# Patient Record
Sex: FEMALE | Race: WHITE | Employment: STUDENT | URBAN - NONMETROPOLITAN AREA
[De-identification: names, ages, dates, MRNs, and addresses within clinical notes are randomized per-mention and may not be internally consistent; named-entity substitution may affect disease eponyms.]

---

## 2020-02-26 ENCOUNTER — DOCTOR'S OFFICE (OUTPATIENT)
Dept: URBAN - NONMETROPOLITAN AREA CLINIC 1 | Facility: CLINIC | Age: 20
Setting detail: OPHTHALMOLOGY
End: 2020-02-26
Payer: COMMERCIAL

## 2020-02-26 ENCOUNTER — RX ONLY (RX ONLY)
Age: 20
End: 2020-02-26

## 2020-02-26 DIAGNOSIS — H52.223: ICD-10-CM

## 2020-02-26 DIAGNOSIS — H52.13: ICD-10-CM

## 2020-02-26 PROCEDURE — 92004 COMPRE OPH EXAM NEW PT 1/>: CPT | Performed by: OPTOMETRIST

## 2020-02-26 ASSESSMENT — CONFRONTATIONAL VISUAL FIELD TEST (CVF)
OD_FINDINGS: FULL
OS_FINDINGS: FULL

## 2020-02-26 ASSESSMENT — REFRACTION_CURRENTRX
OD_CYLINDER: -2.25
OD_SPHERE: -1.50
OD_VPRISM_DIRECTION: SV
OS_SPHERE: -2.25
OS_AXIS: 86
OS_VPRISM_DIRECTION: SV
OD_OVR_VA: 20/
OS_CYLINDER: -1.25
OD_AXIS: 88
OS_OVR_VA: 20/

## 2020-02-26 ASSESSMENT — REFRACTION_MANIFEST
OS_VA2: 20/20
OS_VA1: 20/20-1
OD_VA2: 20/20
OD_SPHERE: -1.50
OS_SPHERE: -2.50
OS_CYLINDER: -1.00
OD_AXIS: 090
OD_VA1: 20/20
OS_AXIS: 085
OD_CYLINDER: -1.75

## 2020-02-26 ASSESSMENT — REFRACTION_AUTOREFRACTION
OS_SPHERE: -2.50
OS_AXIS: 83
OD_SPHERE: -1.50
OS_CYLINDER: -0.75
OD_AXIS: 89
OD_CYLINDER: -1.75

## 2020-02-26 ASSESSMENT — SPHEQUIV_DERIVED
OD_SPHEQUIV: -2.375
OS_SPHEQUIV: -3
OD_SPHEQUIV: -2.375
OS_SPHEQUIV: -2.875

## 2020-02-26 ASSESSMENT — VISUAL ACUITY
OS_BCVA: 20/20-2
OD_BCVA: 20/25

## 2020-02-27 ENCOUNTER — OPTICAL OFFICE (OUTPATIENT)
Dept: URBAN - NONMETROPOLITAN AREA CLINIC 4 | Facility: CLINIC | Age: 20
Setting detail: OPHTHALMOLOGY
End: 2020-02-27
Payer: COMMERCIAL

## 2020-02-27 DIAGNOSIS — H52.223: ICD-10-CM

## 2020-02-27 PROCEDURE — V2784 LENS POLYCARB OR EQUAL: HCPCS | Performed by: OPTOMETRIST

## 2020-02-27 PROCEDURE — V2020 VISION SVCS FRAMES PURCHASES: HCPCS | Performed by: OPTOMETRIST

## 2020-02-27 PROCEDURE — V2103 SPHEROCYLINDR 4.00D/12-2.00D: HCPCS | Performed by: OPTOMETRIST

## 2021-10-14 ENCOUNTER — DOCTOR'S OFFICE (OUTPATIENT)
Dept: URBAN - NONMETROPOLITAN AREA CLINIC 1 | Facility: CLINIC | Age: 21
Setting detail: OPHTHALMOLOGY
End: 2021-10-14
Payer: COMMERCIAL

## 2021-10-14 DIAGNOSIS — H52.223: ICD-10-CM

## 2021-10-14 DIAGNOSIS — Z01.00: ICD-10-CM

## 2021-10-14 DIAGNOSIS — H52.13: ICD-10-CM

## 2021-10-14 PROCEDURE — 92014 COMPRE OPH EXAM EST PT 1/>: CPT | Performed by: OPTOMETRIST

## 2021-10-14 PROCEDURE — 92015 DETERMINE REFRACTIVE STATE: CPT | Performed by: OPTOMETRIST

## 2021-10-14 ASSESSMENT — REFRACTION_AUTOREFRACTION
OS_CYLINDER: SPH
OD_AXIS: 086
OD_CYLINDER: -1.75
OD_SPHERE: -2.00
OS_SPHERE: -3.50

## 2021-10-14 ASSESSMENT — REFRACTION_MANIFEST
OD_VA1: 20/20
OD_CYLINDER: -1.75
OS_VA1: 20/20-1
OD_SPHERE: -2.00
OS_CYLINDER: -0.75
OD_VA2: 20/20
OS_AXIS: 085
OS_VA2: 20/20
OS_SPHERE: -3.00
OD_AXIS: 090

## 2021-10-14 ASSESSMENT — REFRACTION_CURRENTRX
OS_CYLINDER: -1.25
OD_VPRISM_DIRECTION: SV
OD_SPHERE: -1.50
OS_VPRISM_DIRECTION: SV
OD_OVR_VA: 20/
OD_CYLINDER: -2.25
OD_AXIS: 88
OS_AXIS: 86
OS_SPHERE: -2.25
OS_OVR_VA: 20/

## 2021-10-14 ASSESSMENT — SPHEQUIV_DERIVED
OD_SPHEQUIV: -2.875
OD_SPHEQUIV: -2.875
OS_SPHEQUIV: -3.375

## 2021-10-14 ASSESSMENT — VISUAL ACUITY
OD_BCVA: 20/40
OS_BCVA: 20/40

## 2021-10-14 ASSESSMENT — TONOMETRY
OD_IOP_MMHG: 18
OS_IOP_MMHG: 18

## 2021-10-15 ENCOUNTER — OPTICAL OFFICE (OUTPATIENT)
Dept: URBAN - NONMETROPOLITAN AREA CLINIC 4 | Facility: CLINIC | Age: 21
Setting detail: OPHTHALMOLOGY
End: 2021-10-15
Payer: COMMERCIAL

## 2021-10-15 DIAGNOSIS — H52.223: ICD-10-CM

## 2021-10-15 PROCEDURE — V2020 VISION SVCS FRAMES PURCHASES: HCPCS | Performed by: OPTOMETRIST

## 2021-10-15 PROCEDURE — V2103 SPHEROCYLINDR 4.00D/12-2.00D: HCPCS | Performed by: OPTOMETRIST

## 2021-12-26 ENCOUNTER — HOSPITAL ENCOUNTER (EMERGENCY)
Facility: HOSPITAL | Age: 21
End: 2021-12-27
Attending: EMERGENCY MEDICINE | Admitting: EMERGENCY MEDICINE
Payer: COMMERCIAL

## 2021-12-26 DIAGNOSIS — F32.A DEPRESSION: Primary | ICD-10-CM

## 2021-12-26 DIAGNOSIS — F41.9 ANXIETY: ICD-10-CM

## 2021-12-26 DIAGNOSIS — R45.851 SUICIDAL IDEATIONS: ICD-10-CM

## 2021-12-26 LAB
AMPHETAMINES SERPL QL SCN: NEGATIVE
ANION GAP SERPL CALCULATED.3IONS-SCNC: 8 MMOL/L (ref 4–13)
BARBITURATES UR QL: NEGATIVE
BASOPHILS # BLD AUTO: 0.04 THOUSANDS/ΜL (ref 0–0.1)
BASOPHILS NFR BLD AUTO: 1 % (ref 0–1)
BENZODIAZ UR QL: NEGATIVE
BILIRUB UR QL STRIP: NEGATIVE
BUN SERPL-MCNC: 10 MG/DL (ref 5–25)
CALCIUM SERPL-MCNC: 9.2 MG/DL (ref 8.3–10.1)
CHLORIDE SERPL-SCNC: 101 MMOL/L (ref 100–108)
CLARITY UR: CLEAR
CO2 SERPL-SCNC: 30 MMOL/L (ref 21–32)
COCAINE UR QL: NEGATIVE
COLOR UR: YELLOW
CREAT SERPL-MCNC: 0.89 MG/DL (ref 0.6–1.3)
EOSINOPHIL # BLD AUTO: 0.03 THOUSAND/ΜL (ref 0–0.61)
EOSINOPHIL NFR BLD AUTO: 0 % (ref 0–6)
ERYTHROCYTE [DISTWIDTH] IN BLOOD BY AUTOMATED COUNT: 11.9 % (ref 11.6–15.1)
ETHANOL SERPL-MCNC: <3 MG/DL (ref 0–3)
EXT PREG TEST URINE: NEGATIVE
EXT. CONTROL ED NAV: NORMAL
FLUAV RNA RESP QL NAA+PROBE: NEGATIVE
FLUBV RNA RESP QL NAA+PROBE: NEGATIVE
GLUCOSE SERPL-MCNC: 152 MG/DL (ref 65–140)
GLUCOSE UR STRIP-MCNC: NEGATIVE MG/DL
HCT VFR BLD AUTO: 42.4 % (ref 36.5–46.1)
HGB BLD-MCNC: 15.2 G/DL (ref 12–15.4)
HGB UR QL STRIP.AUTO: NEGATIVE
IMM GRANULOCYTES # BLD AUTO: 0.01 THOUSAND/UL (ref 0–0.2)
IMM GRANULOCYTES NFR BLD AUTO: 0 % (ref 0–2)
KETONES UR STRIP-MCNC: ABNORMAL MG/DL
LEUKOCYTE ESTERASE UR QL STRIP: NEGATIVE
LYMPHOCYTES # BLD AUTO: 1.69 THOUSANDS/ΜL (ref 0.6–4.47)
LYMPHOCYTES NFR BLD AUTO: 25 % (ref 14–44)
MCH RBC QN AUTO: 31.1 PG (ref 26.8–34.3)
MCHC RBC AUTO-ENTMCNC: 35.8 G/DL (ref 31.4–37.4)
MCV RBC AUTO: 87 FL (ref 82–98)
METHADONE UR QL: NEGATIVE
MONOCYTES # BLD AUTO: 0.4 THOUSAND/ΜL (ref 0.17–1.22)
MONOCYTES NFR BLD AUTO: 6 % (ref 4–12)
NEUTROPHILS # BLD AUTO: 4.55 THOUSANDS/ΜL (ref 1.85–7.62)
NEUTS SEG NFR BLD AUTO: 68 % (ref 43–75)
NITRITE UR QL STRIP: NEGATIVE
NRBC BLD AUTO-RTO: 0 /100 WBCS
OPIATES UR QL SCN: NEGATIVE
OXYCODONE+OXYMORPHONE UR QL SCN: NEGATIVE
PCP UR QL: NEGATIVE
PH UR STRIP.AUTO: 6 [PH]
PLATELET # BLD AUTO: 263 THOUSANDS/UL (ref 149–390)
PMV BLD AUTO: 9.2 FL (ref 8.9–12.7)
POTASSIUM SERPL-SCNC: 4 MMOL/L (ref 3.5–5.3)
PROT UR STRIP-MCNC: NEGATIVE MG/DL
RBC # BLD AUTO: 4.88 MILLION/UL (ref 3.88–5.12)
RSV RNA RESP QL NAA+PROBE: NEGATIVE
SARS-COV-2 RNA RESP QL NAA+PROBE: NEGATIVE
SODIUM SERPL-SCNC: 139 MMOL/L (ref 136–145)
SP GR UR STRIP.AUTO: 1.02 (ref 1–1.03)
THC UR QL: NEGATIVE
UROBILINOGEN UR QL STRIP.AUTO: 2 E.U./DL
WBC # BLD AUTO: 6.72 THOUSAND/UL (ref 4.31–10.16)

## 2021-12-26 PROCEDURE — 99285 EMERGENCY DEPT VISIT HI MDM: CPT

## 2021-12-26 PROCEDURE — 80307 DRUG TEST PRSMV CHEM ANLYZR: CPT | Performed by: PHYSICIAN ASSISTANT

## 2021-12-26 PROCEDURE — 81025 URINE PREGNANCY TEST: CPT | Performed by: PHYSICIAN ASSISTANT

## 2021-12-26 PROCEDURE — 99285 EMERGENCY DEPT VISIT HI MDM: CPT | Performed by: PHYSICIAN ASSISTANT

## 2021-12-26 PROCEDURE — 80048 BASIC METABOLIC PNL TOTAL CA: CPT | Performed by: PHYSICIAN ASSISTANT

## 2021-12-26 PROCEDURE — 82077 ASSAY SPEC XCP UR&BREATH IA: CPT | Performed by: PHYSICIAN ASSISTANT

## 2021-12-26 PROCEDURE — 36415 COLL VENOUS BLD VENIPUNCTURE: CPT | Performed by: PHYSICIAN ASSISTANT

## 2021-12-26 PROCEDURE — 85025 COMPLETE CBC W/AUTO DIFF WBC: CPT | Performed by: PHYSICIAN ASSISTANT

## 2021-12-26 PROCEDURE — 81003 URINALYSIS AUTO W/O SCOPE: CPT | Performed by: PHYSICIAN ASSISTANT

## 2021-12-26 PROCEDURE — 0241U HB NFCT DS VIR RESP RNA 4 TRGT: CPT | Performed by: PHYSICIAN ASSISTANT

## 2021-12-26 RX ORDER — LAMOTRIGINE 150 MG/1
150 TABLET ORAL 2 TIMES DAILY
COMMUNITY

## 2021-12-26 RX ORDER — QUETIAPINE FUMARATE 100 MG/1
100 TABLET, FILM COATED ORAL
COMMUNITY

## 2021-12-26 RX ORDER — CLONAZEPAM 0.5 MG/1
0.5 TABLET ORAL 3 TIMES DAILY PRN
Status: DISCONTINUED | OUTPATIENT
Start: 2021-12-26 | End: 2021-12-27 | Stop reason: HOSPADM

## 2021-12-26 RX ORDER — NICOTINE 21 MG/24HR
21 PATCH, TRANSDERMAL 24 HOURS TRANSDERMAL DAILY
Status: DISCONTINUED | OUTPATIENT
Start: 2021-12-26 | End: 2021-12-27 | Stop reason: HOSPADM

## 2021-12-26 RX ORDER — CLONAZEPAM 0.5 MG/1
TABLET ORAL AS NEEDED
COMMUNITY

## 2021-12-26 RX ORDER — QUETIAPINE FUMARATE 50 MG/1
50 TABLET, FILM COATED ORAL
COMMUNITY

## 2021-12-26 RX ORDER — PRAZOSIN HYDROCHLORIDE 1 MG/1
2 CAPSULE ORAL
COMMUNITY

## 2021-12-26 RX ORDER — PRAZOSIN HYDROCHLORIDE 2 MG/1
2 CAPSULE ORAL
Status: DISCONTINUED | OUTPATIENT
Start: 2021-12-26 | End: 2021-12-27 | Stop reason: HOSPADM

## 2021-12-26 RX ORDER — VENLAFAXINE HYDROCHLORIDE 37.5 MG/1
37.5 CAPSULE, EXTENDED RELEASE ORAL
Status: DISCONTINUED | OUTPATIENT
Start: 2021-12-27 | End: 2021-12-27 | Stop reason: HOSPADM

## 2021-12-26 RX ORDER — VENLAFAXINE HYDROCHLORIDE 75 MG/1
75 CAPSULE, EXTENDED RELEASE ORAL
Status: DISCONTINUED | OUTPATIENT
Start: 2021-12-27 | End: 2021-12-27 | Stop reason: HOSPADM

## 2021-12-26 RX ORDER — NORETHINDRONE ACETATE AND ETHINYL ESTRADIOL 1MG-20(21)
KIT ORAL DAILY
COMMUNITY
Start: 2021-12-13

## 2021-12-26 RX ORDER — VENLAFAXINE HYDROCHLORIDE 37.5 MG/1
37.5 CAPSULE, EXTENDED RELEASE ORAL DAILY
COMMUNITY

## 2021-12-26 RX ORDER — VENLAFAXINE HYDROCHLORIDE 75 MG/1
CAPSULE, EXTENDED RELEASE ORAL DAILY
COMMUNITY

## 2021-12-26 RX ORDER — LANSOPRAZOLE 30 MG/1
30 CAPSULE, DELAYED RELEASE ORAL DAILY
COMMUNITY

## 2021-12-26 RX ORDER — PANTOPRAZOLE SODIUM 40 MG/1
40 TABLET, DELAYED RELEASE ORAL
Status: DISCONTINUED | OUTPATIENT
Start: 2021-12-27 | End: 2021-12-27 | Stop reason: HOSPADM

## 2021-12-26 RX ORDER — NORETHINDRONE ACETATE AND ETHINYL ESTRADIOL 1MG-20(21)
1 KIT ORAL DAILY
Status: DISCONTINUED | OUTPATIENT
Start: 2021-12-26 | End: 2021-12-27 | Stop reason: HOSPADM

## 2021-12-26 RX ORDER — HYDROXYZINE HYDROCHLORIDE 25 MG/1
25 TABLET, FILM COATED ORAL
Status: DISCONTINUED | OUTPATIENT
Start: 2021-12-26 | End: 2021-12-27 | Stop reason: HOSPADM

## 2021-12-26 RX ORDER — HYDROXYZINE HYDROCHLORIDE 25 MG/1
TABLET, FILM COATED ORAL AS NEEDED
COMMUNITY

## 2021-12-26 RX ADMIN — QUETIAPINE FUMARATE 150 MG: 100 TABLET ORAL at 20:47

## 2021-12-26 RX ADMIN — PRAZOSIN HYDROCHLORIDE 2 MG: 2 CAPSULE ORAL at 20:47

## 2021-12-26 RX ADMIN — LAMOTRIGINE 150 MG: 100 TABLET ORAL at 18:57

## 2021-12-26 RX ADMIN — NICOTINE 21 MG: 21 PATCH, EXTENDED RELEASE TRANSDERMAL at 14:21

## 2021-12-26 RX ADMIN — NORETHINDRONE ACETATE AND ETHINYL ESTRADIOL 1 TABLET: KIT at 18:58

## 2021-12-26 RX ADMIN — HYDROXYZINE HYDROCHLORIDE 25 MG: 25 TABLET, FILM COATED ORAL at 20:47

## 2021-12-26 NOTE — ED NOTES
Clinical and 201 commitment at intake  There are currently no beds within the network  Bed search for single room will be needed

## 2021-12-26 NOTE — ED NOTES
Clinical faxed to Alexandrea [facility] for review  [facility] admissions stated they would not get to review until 3rd shift  [facility] Crisis number provided for follow up

## 2021-12-26 NOTE — ED NOTES
EVS (Eligibility Verification System)    PROMISe:  [facility] - Ren[facility]    [id number]

## 2021-12-26 NOTE — ED NOTES
Copy of 201 and crisis assessment sent into [facility] behavioral intake line to begin bed search  They report there are no beds within network at this time but will continue to re-evaluate        Saad Peng RN  12/26/21 7993

## 2021-12-26 NOTE — ED PROVIDER NOTES
History  Chief Complaint   Patient presents with    Psychiatric Evaluation     pt states she called crisis stating experiencing several panick attacks within past couple days and SI thoughts of going to sleep and not waking up  denies current plan  hx anxiety, depression  pt mentioned past 2 months have been hard due to death of relative and other family issues  denies HI  pt instructed by [facility] to come to ED per further inpt psych evaluation and tx  Patient is a 79-year-old female with past medical history PTSD, depression anxiety disorder who presents emergency for SI and severe anxiety  Patient states that his worsening over last 2 months ever since her grandmother   She states that she has been having increased panic attacks and increased thoughts of harming herself  She has no distinct plan  Psychiatric Evaluation  Presenting symptoms: depression and suicidal thoughts    Degree of incapacity (severity): Moderate  Timing:  Constant  Progression:  Worsening  Chronicity:  New  Context: medication and stressful life event    Treatment compliance: All of the time  Relieved by:  Nothing  Worsened by:  Nothing  Ineffective treatments:  Anti-anxiety medications and antidepressants  Associated symptoms: anxiety and appetite change    Associated symptoms: no abdominal pain, no anhedonia, no fatigue, no feelings of worthlessness, no headaches, no hypersomnia, no hyperventilation, no insomnia, no irritability and no weight change    Risk factors: hx of mental illness and hx of suicide attempts        Prior to Admission Medications   Prescriptions Last Dose Informant Patient Reported? Taking?    QUEtiapine (SEROquel) 100 mg tablet  Self Yes Yes   Sig: Take 100 mg by mouth daily at bedtime   QUEtiapine (SEROquel) 50 mg tablet  Self Yes Yes   Sig: Take 50 mg by mouth daily at bedtime   clonazePAM (KlonoPIN) 0 5 mg tablet   Yes Yes   Sig: as needed   hydrOXYzine HCL (ATARAX) 25 mg tablet   Yes Yes Sig: Take by mouth as needed   lamoTRIgine (LaMICtal) 150 MG tablet   Yes Yes   Sig: Take 150 mg by mouth 2 (two) times a day   lansoprazole (PREVACID) 30 mg capsule   Yes Yes   Sig: Take 30 mg by mouth daily   norethindrone-ethinyl estradiol (JUNEL FE 1/20) 1-20 MG-MCG per tablet   Yes Yes   Sig: Take by mouth daily   prazosin (MINIPRESS) 1 mg capsule   Yes Yes   Sig: Take 2 mg by mouth daily at bedtime   venlafaxine (EFFEXOR-XR) 37 5 mg 24 hr capsule   Yes Yes   Sig: Take 37 5 mg by mouth daily   venlafaxine (EFFEXOR-XR) 75 mg 24 hr capsule   Yes No   Sig: Take by mouth daily      Facility-Administered Medications: None       Past Medical History:   Diagnosis Date    Anxiety     Borderline personality disorder ([facility] [address] )     Depression     IBS (irritable bowel syndrome)     Panic attacks        Past Surgical History:   Procedure Laterality Date    WISDOM TOOTH EXTRACTION         History reviewed  No pertinent family history  I have reviewed and agree with the history as documented  E-Cigarette/Vaping    E-Cigarette Use Current Every Day User      E-Cigarette/Vaping Substances    Nicotine Yes     THC No     CBD No     Flavoring Yes      Social History     Tobacco Use    Smoking status: Former Smoker     Types: Cigarettes    Smokeless tobacco: Never Used   Vaping Use    Vaping Use: Every day    Substances: Nicotine, Flavoring   Substance Use Topics    Alcohol use: Not Currently    Drug use: Not Currently       Review of Systems   Constitutional: Positive for appetite change  Negative for fatigue and irritability  Gastrointestinal: Negative for abdominal pain  Neurological: Negative for headaches  Psychiatric/Behavioral: Positive for suicidal ideas  The patient is nervous/anxious  The patient does not have insomnia  All other systems reviewed and are negative  Physical Exam  Physical Exam  Vitals and nursing note reviewed  Constitutional:       General: He is not in acute distress  Appearance: He is well-developed  HENT:      Head: Normocephalic and atraumatic  Mouth/Throat:      Mouth: Mucous membranes are moist    Eyes:      Extraocular Movements: Extraocular movements intact  Pupils: Pupils are equal, round, and reactive to light  Cardiovascular:      Rate and Rhythm: Normal rate and regular rhythm  Pulses: Normal pulses  Heart sounds: No murmur heard  Pulmonary:      Effort: Pulmonary effort is normal  No respiratory distress  Breath sounds: Normal breath sounds  Abdominal:      General: Bowel sounds are normal       Palpations: Abdomen is soft  Tenderness: There is no abdominal tenderness  Musculoskeletal:      Cervical back: Normal range of motion  Right lower leg: No edema  Left lower leg: No edema  Skin:     General: Skin is warm and dry  Capillary Refill: Capillary refill takes less than 2 seconds  Neurological:      General: No focal deficit present  Mental Status: He is alert and oriented to person, place, and time  Psychiatric:         Mood and Affect: Mood is anxious  Behavior: Behavior normal          Thought Content: Thought content includes suicidal ideation  Thought content does not include homicidal or suicidal plan           Vital Signs  ED Triage Vitals [12/26/21 1310]   Temperature Pulse Respirations Blood Pressure SpO2   98 5 °F (36 9 °C) 100 17 140/92 97 %      Temp Source Heart Rate Source Patient Position - Orthostatic VS BP Location FiO2 (%)   Oral Monitor Sitting Right arm --      Pain Score       No Pain           Vitals:    12/26/21 1310   BP: 140/92   Pulse: 100   Patient Position - Orthostatic VS: Sitting         Visual Acuity      ED Medications  Medications   nicotine (NICODERM CQ) 21 mg/24 hr TD 24 hr patch 21 mg (21 mg Transdermal Medication Applied 12/26/21 1421)   clonazePAM (KlonoPIN) tablet 0 5 mg (has no administration in time range)   hydrOXYzine HCL (ATARAX) tablet 25 mg (25 mg Oral Given 12/26/21 2047)   lamoTRIgine (LaMICtal) tablet 150 mg (150 mg Oral Given 12/26/21 1857)   pantoprazole (PROTONIX) EC tablet 40 mg (has no administration in time range)   prazosin (MINIPRESS) capsule 2 mg (2 mg Oral Given 12/26/21 2047)   QUEtiapine (SEROquel) tablet 150 mg (150 mg Oral Given 12/26/21 2047)   venlafaxine (EFFEXOR-XR) 24 hr capsule 75 mg (has no administration in time range)   venlafaxine (EFFEXOR-XR) 24 hr capsule 37 5 mg (has no administration in time range)   norethindrone-ethinyl estradiol (JUNEL FE 1/20) 1-20 MG-MCG per tablet 1 tablet (1 tablet Oral Given 12/26/21 1858)       Diagnostic Studies  Results Reviewed     Procedure Component Value Units Date/Time    Rapid drug screen, urine [[id number]]  (Normal) Collected: 12/26/21 1410    Lab Status: Final result Specimen: Urine, Clean Catch Updated: 12/26/21 1440     Amph/Meth UR Negative     Barbiturate Ur Negative     Benzodiazepine Urine Negative     Cocaine Urine Negative     Methadone Urine Negative     Opiate Urine Negative     PCP Ur Negative     THC Urine Negative     Oxycodone Urine Negative    Narrative:      FOR MEDICAL PURPOSES ONLY  IF CONFIRMATION NEEDED PLEASE CONTACT THE LAB WITHIN 5 DAYS  Drug Screen Cutoff Levels:  AMPHETAMINE/METHAMPHETAMINES  1000 ng/mL  BARBITURATES     200 ng/mL  BENZODIAZEPINES     200 ng/mL  COCAINE      300 ng/mL  METHADONE      300 ng/mL  OPIATES      300 ng/mL  PHENCYCLIDINE     25 ng/mL  THC       50 ng/mL  OXYCODONE      100 ng/mL    COVID/FLU/RSV - 2 hour TAT [[id number]]  (Normal) Collected: 12/26/21 1342    Lab Status: Final result Specimen: Nares from Nasopharyngeal Swab Updated: 12/26/21 1430     SARS-CoV-2 Negative     INFLUENZA A PCR Negative     INFLUENZA B PCR Negative     RSV PCR Negative    Narrative:      FOR PEDIATRIC PATIENTS - copy/paste COVID Guidelines URL to browser: [email or web address]  [employer]     This test has been authorized by FDA under an EUA (Emergency Use Assay) for use by authorized laboratories  Clinical caution and judgement should be used with the interpretation of these results with consideration of the clinical impression and other laboratory testing  Testing reported as "Positive" or "Negative" has been proven to be accurate according to standard laboratory validation requirements  All testing is performed with control materials showing appropriate reactivity at standard intervals  UA w Reflex to Microscopic w Reflex to Culture [[id number]]  (Abnormal) Collected: 12/26/21 1410    Lab Status: Final result Specimen: Urine, Clean Catch Updated: 12/26/21 1420     Color, UA Yellow     Clarity, UA Clear     Specific Gravity, UA 1 025     pH, UA 6 0     Leukocytes, UA Negative     Nitrite, UA Negative     Protein, UA Negative mg/dl      Glucose, UA Negative mg/dl      Ketones, UA Trace mg/dl      Urobilinogen, UA 2 0 E U /dl      Bilirubin, UA Negative     Blood, UA Negative    POCT pregnancy, urine [[id number]]  (Normal) Resulted: 12/26/21 1412    Lab Status: Final result Updated: 12/26/21 1412     EXT PREG TEST UR (Ref: Negative) Negative     Control Valid    Ethanol [[id number]]  (Normal) Collected: 12/26/21 1342    Lab Status: Final result Specimen: Blood from Arm, Left Updated: 12/26/21 1403     Ethanol Lvl <3 mg/dL     Basic metabolic panel [[id number]]  (Abnormal) Collected: 12/26/21 1342    Lab Status: Final result Specimen: Blood from Arm, Left Updated: 12/26/21 1401     Sodium 139 mmol/L      Potassium 4 0 mmol/L      Chloride 101 mmol/L      CO2 30 mmol/L      ANION GAP 8 mmol/L      BUN 10 mg/dL      Creatinine 0 89 mg/dL      Glucose 152 mg/dL      Calcium 9 2 mg/dL      eGFR --    Narrative:      Notes:     1  eGFR calculation is only valid for adults 18 years and older    2  EGFR calculation cannot be performed for patients who are transgender, non-binary, or whose legal sex, sex at birth, and gender identity differ  CBC and differential [[id number]] Collected: 12/26/21 1342    Lab Status: Final result Specimen: Blood from Arm, Left Updated: 12/26/21 1349     WBC 6 72 Thousand/uL      RBC 4 88 Million/uL      Hemoglobin 15 2 g/dL      Hematocrit 42 4 %      MCV 87 fL      MCH 31 1 pg      MCHC 35 8 g/dL      RDW 11 9 %      MPV 9 2 fL      Platelets 773 Thousands/uL      nRBC 0 /100 WBCs      Neutrophils Relative 68 %      Immat GRANS % 0 %      Lymphocytes Relative 25 %      Monocytes Relative 6 %      Eosinophils Relative 0 %      Basophils Relative 1 %      Neutrophils Absolute 4 55 Thousands/µL      Immature Grans Absolute 0 01 Thousand/uL      Lymphocytes Absolute 1 69 Thousands/µL      Monocytes Absolute 0 40 Thousand/µL      Eosinophils Absolute 0 03 Thousand/µL      Basophils Absolute 0 04 Thousands/µL                  No orders to display              Procedures  Procedures         ED Course                               SBIRT 22yo+      Most Recent Value   SBIRT (22 yo +)    In order to provide better care to our patients, we are screening all of our patients for alcohol and drug use  Would it be okay to ask you these screening questions? No Filed at: 12/26/2021 1410                    MDM  Number of Diagnoses or Management Options  Anxiety  Depression  Suicidal ideations  Diagnosis management comments: Crisis evaluation recommended to a 201 and the  patient in agreement  Patient signed 12 agreeable for inpatient psych treatment  Patient is medically cleared at this time 2:46 PM 12/26/21       Pending acceptance to available facility       12:06 AM  Bed search in process for accepting facility        Amount and/or Complexity of Data Reviewed  Clinical lab tests: ordered and reviewed  Decide to obtain previous medical records or to obtain history from someone other than the patient: yes  Review and summarize past medical records: yes  Independent visualization of images, tracings, or specimens: yes    Risk of Complications, Morbidity, and/or Mortality  Presenting problems: moderate  Diagnostic procedures: moderate  Management options: moderate    Patient Progress  Patient progress: stable      Disposition  Final diagnoses:   Depression   Suicidal ideations   Anxiety     Time reflects when diagnosis was documented in both MDM as applicable and the Disposition within this note     Time User Action Codes Description Comment    12/26/2021  2:42 PM Cele Miller Add Dace [place]  A] Depression     12/26/2021  2:42 PM Cele Miller Add [Y62 365] Suicidal ideations     12/26/2021  2:46 PM Fraser Lundborg Add [F41 9] Anxiety       ED Disposition     ED Disposition Condition Date/Time Comment    Transfer to [facility] Dec 26, 2021  2:42 PM Chen Correa has been medically cleared  Follow-up Information    None         Patient's Medications   Discharge Prescriptions    No medications on file       No discharge procedures on file      PDMP Review     None          ED Provider  Electronically Signed by           Nik Platt PA-C  12/27/21 0006

## 2021-12-26 NOTE — ED ATTENDING ATTESTATION
12/26/2021  Julio Palacio DO, saw and evaluated the patient  I have discussed the patient with the resident/non-physician practitioner and agree with the resident's/non-physician practitioner's findings, Plan of Care, and MDM as documented in the resident's/non-physician practitioner's note, except where noted  All available labs and Radiology studies were reviewed  I was present for key portions of any procedure(s) performed by the resident/non-physician practitioner and I was immediately available to provide assistance  At this point I agree with the current assessment done in the Emergency Department  I have conducted an independent evaluation of this patient a history and physical is as follows:    ED Course     Patient was seen evaluated with physician's assistant  Patient was referred to the emergency department by [facility] crisis secondary to suicidal ideation  Patient does not feel safe at home and wishes to sign herself in for mental health evaluation  History obtained and physical exam performed  Reveals a stable patient at this time  She is resting comfortably  201 was completed    Routine at home meds ordered bed search will ensue

## 2021-12-27 VITALS
HEART RATE: 98 BPM | DIASTOLIC BLOOD PRESSURE: 61 MMHG | WEIGHT: 145.5 LBS | RESPIRATION RATE: 16 BRPM | BODY MASS INDEX: 25.78 KG/M2 | HEIGHT: 63 IN | SYSTOLIC BLOOD PRESSURE: 126 MMHG | TEMPERATURE: 98.5 F | OXYGEN SATURATION: 98 %

## 2021-12-27 RX ADMIN — NICOTINE 21 MG: 21 PATCH, EXTENDED RELEASE TRANSDERMAL at 09:09

## 2021-12-27 RX ADMIN — VENLAFAXINE HYDROCHLORIDE 37.5 MG: 37.5 CAPSULE, EXTENDED RELEASE ORAL at 09:05

## 2021-12-27 RX ADMIN — PANTOPRAZOLE SODIUM 40 MG: 40 TABLET, DELAYED RELEASE ORAL at 09:04

## 2021-12-27 RX ADMIN — VENLAFAXINE HYDROCHLORIDE 75 MG: 75 CAPSULE, EXTENDED RELEASE ORAL at 09:04

## 2021-12-27 RX ADMIN — LAMOTRIGINE 150 MG: 100 TABLET ORAL at 09:06

## 2021-12-27 NOTE — ED NOTES
Patient is accepted at [facility]  Patient is accepted by Dr Lolis Shabazz per Yessy Zamora from [place] [address]  Transportation is arranged with TBD  Transportation is scheduled for T[facility]  Patient may go to the floor at T[facility]  Nurse report is not needed prior to patient transfer  Insurance Authorization for admission:   [facility]  Phone number: [phone]  Spoke to Vinay LEE      15 days approved  Level of care: Inpatient Psych 201  Review on 1/10/2022  Authorization # [id number]  Insurance Authorization for Transportation:  TBD once transport is arranged

## 2021-12-27 NOTE — ED NOTES
Bed Search     [place]- Per Janis, no beds  [place]- Per Jennifer, no beds  [facility]- Per Susi Avina, no beds  [facility]- phone call placed to follow up admissions, spoke with Emperatriz Valenzuela, has not been able to review patient yet, 3rd shift to follow up     [place]- Per Arbie Gosselin, no beds   [facility] Paulino, no beds   [place]- Per PAUL, no beds   [facility]- unable to reach admissions  [facility] - Per Serina, no beds

## 2021-12-27 NOTE — ED NOTES
Crisis provided p/u time with Cody from [facility] confirmed everything else is complete at this time and nothing else is needed on their end        Crisis informed ED RN NICOLÁS GARNICA  of p/u time as well

## 2021-12-27 NOTE — ED NOTES
Transportation is arranged with [facility]  Transportation is scheduled for 1030  Patient may go to the floor after 0800

## 2021-12-27 NOTE — ED NOTES
Spoke with Bessie at [facility] regarding transport  [facility] to follow up with ED crisis once transport is arranged  [facility] admissions to be updated on ETA

## 2021-12-27 NOTE — ED CARE HANDOFF
Emergency Department Sign Out Note        Sign out and transfer of care from [facility],   See Separate Emergency Department note  The patient, Herbert Leon, was evaluated by the previous provider for psychiatric evaluation  Workup Completed:  Medically cleared  201 signed  Bed search ongoing    ED Course / Workup Pending (followup): No issues overnight  The patient was signed out to Dr Zelda Joshi  Continue bed search  Patient accepted to [facility]  Unknown  time  Procedures  MDM        Disposition  Final diagnoses:   Depression   Suicidal ideations   Anxiety     Time reflects when diagnosis was documented in both MDM as applicable and the Disposition within this note     Time User Action Codes Description Comment    12/26/2021  2:42 PM Jovanny Jorge Add [F32  A] Depression     12/26/2021  2:42 PM Jovanny Jorge Add [B11 029] Suicidal ideations     12/26/2021  2:46 PM Osvaldo Mckee Add [F41 9] Anxiety       ED Disposition     ED Disposition Condition Date/Time Comment    Transfer to [facility] Dec 26, 2021  2:42 PM Herbert Leon has been medically cleared  MD Documentation      Most Recent Value   Accepting Physician Dr Froylan Rodriguez Name, [facility]      RN Documentation      [facility]      Follow-up Information    None       Patient's Medications   Discharge Prescriptions    No medications on file     No discharge procedures on file         ED Provider  Electronically Signed by     Domenico San DO  12/27/21 5988

## 2021-12-27 NOTE — EMTALA/ACUTE CARE TRANSFER
[address] [facility]  [address]  [facility] [address]  Dept: [phone]      EMTALA TRANSFER CONSENT    NAME Erasmo Joseph                                         2000                              MRN [id number]    I have been informed of my rights regarding examination, treatment, and transfer   by Dr Dennis Infante DO    Benefits: Specialized equipment and/or services available at the receiving facility (Include comment)________________________ (Inpatient psych)    Risks:        Consent for Transfer:  I acknowledge that my medical condition has been evaluated and explained to me by the emergency department physician or other qualified medical person and/or my attending physician, who has recommended that I be transferred to the service of  Accepting Physician: Dr Jemal Hoover at [facility] Name, [address] : [place], [place]  The above potential benefits of such transfer, the potential risks associated with such transfer, and the probable risks of not being transferred have been explained to me, and I fully understand them  The doctor has explained that, in my case, the benefits of transfer outweigh the risks  I agree to be transferred  I authorize the performance of emergency medical procedures and treatments upon me in both transit and upon arrival at the receiving facility  Additionally, I authorize the release of any and all medical records to the receiving facility and request they be transported with me, if possible  I understand that the safest mode of transportation during a medical emergency is an ambulance and that the Hospital advocates the use of this mode of transport  Risks of traveling to the receiving facility by car, including absence of medical control, life sustaining equipment, such as oxygen, and medical personnel has been explained to me and I fully understand them      ([place] BELOW)  [  ]  I consent to the stated transfer and to be transported by ambulance/helicopter  [  ]  I consent to the stated transfer, but refuse transportation by ambulance and accept full responsibility for my transportation by car  I understand the risks of non-ambulance transfers and I exonerate the Hospital and its staff from any deterioration in my condition that results from this refusal     X___________________________________________    DATE  21  TIME________  Signature of patient or legally responsible individual signing on patient behalf           RELATIONSHIP TO PATIENT_________________________          Provider Certification    NAME Micah Isbell                                         2000                              MRN [id number]    A medical screening exam was performed on the above named patient  Based on the examination:    Condition Necessitating Transfer The primary encounter diagnosis was Depression  Diagnoses of Suicidal ideations and Anxiety were also pertinent to this visit      Patient Condition: The patient has been stabilized such that within reasonable medical probability, no material deterioration of the patient condition or the condition of the unborn child(bessie) is likely to result from the transfer    Reason for Transfer: Level of Care needed not available at this facility    Transfer Requirements: [address], [place], [place]   · Space available and qualified personnel available for treatment as acknowledged by    · Agreed to accept transfer and to provide appropriate medical treatment as acknowledged by       Dr Tamara Tiwari  · Appropriate medical records of the examination and treatment of the patient are provided at the time of transfer   [address], [address] _______  · Transfer will be performed by qualified personnel from    and appropriate transfer equipment as required, including the use of necessary and appropriate life support measures  Provider Certification: I have examined the patient and explained the following risks and benefits of being transferred/refusing transfer to the patient/family:         Based on these reasonable risks and benefits to the patient and/or the unborn child(bessie), and based upon the information available at the time of the patients examination, I certify that the medical benefits reasonably to be expected from the provision of appropriate medical treatments at another medical facility outweigh the increasing risks, if any, to the individuals medical condition, and in the case of labor to the unborn child, from effecting the transfer      X____________________________________________ DATE 12/27/21        TIME_______      ORIGINAL - SEND TO MEDICAL RECORDS   COPY - SEND WITH PATIENT DURING TRANSFER

## 2022-10-17 ENCOUNTER — DOCTOR'S OFFICE (OUTPATIENT)
Dept: URBAN - NONMETROPOLITAN AREA CLINIC 1 | Facility: CLINIC | Age: 22
Setting detail: OPHTHALMOLOGY
End: 2022-10-17
Payer: COMMERCIAL

## 2022-10-17 DIAGNOSIS — Z01.00: ICD-10-CM

## 2022-10-17 DIAGNOSIS — H52.223: ICD-10-CM

## 2022-10-17 DIAGNOSIS — H52.13: ICD-10-CM

## 2022-10-17 PROCEDURE — 92014 COMPRE OPH EXAM EST PT 1/>: CPT | Performed by: OPTOMETRIST

## 2022-10-17 ASSESSMENT — REFRACTION_CURRENTRX
OD_VPRISM_DIRECTION: SV
OD_CYLINDER: -1.75
OD_OVR_VA: 20/
OS_AXIS: 094
OD_SPHERE: -2.00
OS_VPRISM_DIRECTION: SV
OS_OVR_VA: 20/
OD_AXIS: 100
OS_SPHERE: -3.25
OS_CYLINDER: -0.50

## 2022-10-17 ASSESSMENT — REFRACTION_MANIFEST
OS_VA1: 20/20-2
OD_VA2: 20/20
OD_CYLINDER: -1.25
OD_VA1: 20/20
OD_AXIS: 085
OD_SPHERE: -2.50
OS_AXIS: 070
OS_SPHERE: -3.50
OS_CYLINDER: -0.75
OS_VA2: 20/20-2

## 2022-10-17 ASSESSMENT — VISUAL ACUITY
OS_BCVA: 20/25
OD_BCVA: 20/40

## 2022-10-17 ASSESSMENT — CONFRONTATIONAL VISUAL FIELD TEST (CVF)
OS_FINDINGS: FULL
OD_FINDINGS: FULL

## 2022-10-17 ASSESSMENT — REFRACTION_AUTOREFRACTION
OD_SPHERE: -2.50
OD_CYLINDER: -1.25
OS_SPHERE: -3.50
OD_AXIS: 084
OS_CYLINDER: +0.75
OS_AXIS: 068

## 2022-10-17 ASSESSMENT — SPHEQUIV_DERIVED
OD_SPHEQUIV: -3.125
OS_SPHEQUIV: -3.125
OD_SPHEQUIV: -3.125
OS_SPHEQUIV: -3.875

## 2022-10-17 ASSESSMENT — TONOMETRY
OS_IOP_MMHG: 15
OD_IOP_MMHG: 15

## 2022-11-04 ENCOUNTER — DOCTOR'S OFFICE (OUTPATIENT)
Dept: URBAN - NONMETROPOLITAN AREA CLINIC 1 | Facility: CLINIC | Age: 22
Setting detail: OPHTHALMOLOGY
End: 2022-11-04

## 2022-11-04 DIAGNOSIS — H52.223: ICD-10-CM

## 2022-11-04 PROCEDURE — CLFUP CONTACT LENS FOLLOW-UP: Performed by: OPTOMETRIST

## 2022-11-04 ASSESSMENT — REFRACTION_CURRENTRX
OS_CYLINDER: -0.50
OS_VPRISM_DIRECTION: SV
OS_OVR_VA: 20/
OD_AXIS: 100
OS_SPHERE: -3.25
OD_VPRISM_DIRECTION: SV
OS_AXIS: 094
OD_SPHERE: -2.00
OD_CYLINDER: -1.75
OD_OVR_VA: 20/

## 2022-11-04 ASSESSMENT — REFRACTION_MANIFEST
OD_VA2: 20/20
OD_SPHERE: -2.50
OS_VA1: 20/20-2
OS_VA2: 20/20-2
OD_CYLINDER: -1.25
OS_CYLINDER: -0.75
OS_AXIS: 070
OD_VA1: 20/20
OS_SPHERE: -3.50
OD_AXIS: 085

## 2022-11-04 ASSESSMENT — REFRACTION_AUTOREFRACTION
OD_SPHERE: -2.50
OS_CYLINDER: +0.75
OD_CYLINDER: -1.25
OS_AXIS: 068
OD_AXIS: 084
OS_SPHERE: -3.50

## 2022-11-04 ASSESSMENT — SPHEQUIV_DERIVED
OS_SPHEQUIV: -3.125
OD_SPHEQUIV: -3.125
OS_SPHEQUIV: -3.875
OD_SPHEQUIV: -3.125

## 2022-11-04 ASSESSMENT — VISUAL ACUITY
OS_BCVA: 20/20-1
OD_BCVA: 20/20-1

## 2022-11-04 ASSESSMENT — CONFRONTATIONAL VISUAL FIELD TEST (CVF)
OS_FINDINGS: FULL
OD_FINDINGS: FULL

## 2022-12-30 ENCOUNTER — OPTICAL OFFICE (OUTPATIENT)
Dept: URBAN - NONMETROPOLITAN AREA CLINIC 4 | Facility: CLINIC | Age: 22
Setting detail: OPHTHALMOLOGY
End: 2022-12-30
Payer: COMMERCIAL

## 2022-12-30 DIAGNOSIS — H52.223: ICD-10-CM

## 2022-12-30 PROCEDURE — S0500 DISPOS CONT LENS: HCPCS | Performed by: OPTOMETRIST

## 2023-10-18 ENCOUNTER — DOCTOR'S OFFICE (OUTPATIENT)
Dept: URBAN - NONMETROPOLITAN AREA CLINIC 1 | Facility: CLINIC | Age: 23
Setting detail: OPHTHALMOLOGY
End: 2023-10-18
Payer: COMMERCIAL

## 2023-10-18 DIAGNOSIS — Z01.00: ICD-10-CM

## 2023-10-18 DIAGNOSIS — H52.223: ICD-10-CM

## 2023-10-18 PROCEDURE — 92310 CONTACT LENS FITTING OU: CPT | Performed by: OPTOMETRIST

## 2023-10-18 PROCEDURE — 92014 COMPRE OPH EXAM EST PT 1/>: CPT | Performed by: OPTOMETRIST

## 2023-10-18 ASSESSMENT — CONFRONTATIONAL VISUAL FIELD TEST (CVF)
OD_FINDINGS: FULL
OS_FINDINGS: FULL

## 2023-10-18 ASSESSMENT — REFRACTION_CURRENTRX
OS_CYLINDER: -0.75
OS_OVR_VA: 20/
OD_VPRISM_DIRECTION: SV
OD_CYLINDER: -1.75
OS_SPHERE: -3.00
OD_SPHERE: -2.00
OD_OVR_VA: 20/
OD_AXIS: 92
OS_VPRISM_DIRECTION: SV
OS_AXIS: 84

## 2023-10-18 ASSESSMENT — REFRACTION_AUTOREFRACTION
OS_AXIS: 012
OD_AXIS: 099
OS_CYLINDER: -1.25
OD_CYLINDER: -1.00
OD_SPHERE: -3.00
OS_SPHERE: -4.25

## 2023-10-18 ASSESSMENT — SPHEQUIV_DERIVED
OD_SPHEQUIV: -3.375
OS_SPHEQUIV: -3.875
OD_SPHEQUIV: -3.5
OS_SPHEQUIV: -4.875

## 2023-10-18 ASSESSMENT — REFRACTION_MANIFEST
OD_VA2: 20/20
OS_VA2: 20/20-2
OD_SPHERE: -2.75
OD_CYLINDER: -1.25
OS_AXIS: 070
OS_SPHERE: -3.50
OS_VA1: 20/20-2
OD_VA1: 20/20
OD_AXIS: 095
OS_CYLINDER: -0.75

## 2023-10-18 ASSESSMENT — VISUAL ACUITY
OD_BCVA: 20/25-2
OS_BCVA: 20/25-1

## 2023-10-18 ASSESSMENT — TONOMETRY
OS_IOP_MMHG: 15
OD_IOP_MMHG: 15

## 2023-11-08 ENCOUNTER — OPTICAL OFFICE (OUTPATIENT)
Dept: URBAN - NONMETROPOLITAN AREA CLINIC 4 | Facility: CLINIC | Age: 23
Setting detail: OPHTHALMOLOGY
End: 2023-11-08
Payer: COMMERCIAL

## 2023-11-08 DIAGNOSIS — H52.223: ICD-10-CM

## 2023-11-08 PROCEDURE — S0500 DISPOS CONT LENS: HCPCS | Mod: LT | Performed by: OPTOMETRIST

## 2023-11-08 PROCEDURE — S0500 DISPOS CONT LENS: HCPCS | Mod: RT | Performed by: OPTOMETRIST

## 2024-10-22 ENCOUNTER — DOCTOR'S OFFICE (OUTPATIENT)
Dept: URBAN - NONMETROPOLITAN AREA CLINIC 1 | Facility: CLINIC | Age: 24
Setting detail: OPHTHALMOLOGY
End: 2024-10-22
Payer: COMMERCIAL

## 2024-10-22 DIAGNOSIS — H52.13: ICD-10-CM

## 2024-10-22 DIAGNOSIS — H52.223: ICD-10-CM

## 2024-10-22 PROCEDURE — 92310 CONTACT LENS FITTING OU: CPT | Performed by: OPTOMETRIST

## 2024-10-22 PROCEDURE — 92014 COMPRE OPH EXAM EST PT 1/>: CPT | Performed by: OPTOMETRIST

## 2024-10-22 ASSESSMENT — REFRACTION_MANIFEST
OS_AXIS: 070
OD_VA2: 20/20
OD_VA1: 20/20
OD_AXIS: 095
OS_VA1: 20/20-2
OS_CYLINDER: -0.75
OS_VA2: 20/20-2
OD_CYLINDER: -1.25
OD_SPHERE: -3.00
OS_SPHERE: -3.75

## 2024-10-22 ASSESSMENT — TONOMETRY
OS_IOP_MMHG: 15
OD_IOP_MMHG: 15

## 2024-10-22 ASSESSMENT — REFRACTION_CURRENTRX
OS_AXIS: 070
OS_CYLINDER: -0.75
OS_OVR_VA: 20/
OD_OVR_VA: 20/
OS_SPHERE: -3.50
OD_CYLINDER: -1.25
OD_AXIS: 095
OD_SPHERE: -2.75
OS_VPRISM_DIRECTION: SV
OD_VPRISM_DIRECTION: SV

## 2024-10-22 ASSESSMENT — VISUAL ACUITY
OD_BCVA: 20/25-2
OS_BCVA: 20/25

## 2024-10-22 ASSESSMENT — CONFRONTATIONAL VISUAL FIELD TEST (CVF)
OS_FINDINGS: FULL
OD_FINDINGS: FULL

## 2024-10-22 ASSESSMENT — REFRACTION_AUTOREFRACTION
OS_AXIS: 012
OD_CYLINDER: -1.00
OD_AXIS: 099
OD_SPHERE: -3.00
OS_SPHERE: -4.25
OS_CYLINDER: -1.25

## 2025-01-15 ENCOUNTER — OPTICAL OFFICE (OUTPATIENT)
Dept: URBAN - NONMETROPOLITAN AREA CLINIC 4 | Facility: CLINIC | Age: 25
Setting detail: OPHTHALMOLOGY
End: 2025-01-15
Payer: COMMERCIAL

## 2025-01-15 DIAGNOSIS — H52.223: ICD-10-CM

## 2025-01-15 PROCEDURE — S0500 DISPOS CONT LENS: HCPCS | Mod: RT | Performed by: OPTOMETRIST

## 2025-01-15 PROCEDURE — S0500 DISPOS CONT LENS: HCPCS | Mod: LT | Performed by: OPTOMETRIST
